# Patient Record
Sex: FEMALE | URBAN - METROPOLITAN AREA
[De-identification: names, ages, dates, MRNs, and addresses within clinical notes are randomized per-mention and may not be internally consistent; named-entity substitution may affect disease eponyms.]

---

## 2018-03-06 ENCOUNTER — TRANSFERRED RECORDS (OUTPATIENT)
Dept: HEALTH INFORMATION MANAGEMENT | Facility: CLINIC | Age: 38
End: 2018-03-06

## 2018-03-07 ENCOUNTER — MEDICAL CORRESPONDENCE (OUTPATIENT)
Dept: HEALTH INFORMATION MANAGEMENT | Facility: CLINIC | Age: 38
End: 2018-03-07

## 2018-04-06 DIAGNOSIS — H53.2 DIPLOPIA: Primary | ICD-10-CM

## 2018-04-09 ENCOUNTER — OFFICE VISIT (OUTPATIENT)
Dept: OPHTHALMOLOGY | Facility: CLINIC | Age: 38
End: 2018-04-09
Attending: OPHTHALMOLOGY
Payer: COMMERCIAL

## 2018-04-09 DIAGNOSIS — H50.05 ALTERNATING ESOTROPIA: Primary | ICD-10-CM

## 2018-04-09 DIAGNOSIS — H53.2 DIPLOPIA: ICD-10-CM

## 2018-04-09 PROCEDURE — 92060 SENSORIMOTOR EXAMINATION: CPT | Mod: ZF | Performed by: OPHTHALMOLOGY

## 2018-04-09 PROCEDURE — G0463 HOSPITAL OUTPT CLINIC VISIT: HCPCS | Mod: 25,ZF | Performed by: TECHNICIAN/TECHNOLOGIST

## 2018-04-09 RX ORDER — OMEGA-3 FATTY ACIDS/FISH OIL 300-1000MG
CAPSULE ORAL
COMMUNITY

## 2018-04-09 ASSESSMENT — CONF VISUAL FIELD
METHOD: COUNTING FINGERS
OS_NORMAL: 1
OD_NORMAL: 1

## 2018-04-09 ASSESSMENT — REFRACTION_WEARINGRX
OS_CYLINDER: +0.75
OS_SPHERE: -3.00
OS_AXIS: 188
OD_SPHERE: -2.50
SPECS_TYPE: SVL
OD_CYLINDER: SPHERE

## 2018-04-09 ASSESSMENT — TONOMETRY
IOP_METHOD: ICARE
OD_IOP_MMHG: 10
OS_IOP_MMHG: 12

## 2018-04-09 ASSESSMENT — EXTERNAL EXAM - RIGHT EYE: OD_EXAM: NORMAL

## 2018-04-09 ASSESSMENT — SLIT LAMP EXAM - LIDS
COMMENTS: NORMAL
COMMENTS: NORMAL

## 2018-04-09 ASSESSMENT — VISUAL ACUITY
OD_CC: 20/20
CORRECTION_TYPE: GLASSES
METHOD: SNELLEN - LINEAR
OS_CC: 20/20

## 2018-04-09 ASSESSMENT — EXTERNAL EXAM - LEFT EYE: OS_EXAM: NORMAL

## 2018-04-09 NOTE — PROGRESS NOTES
Assessment & Plan     Darshana Cash is a 37 year old female with the following diagnoses:   1. Alternating esotropia    2. Diplopia       She developed double vision as a teenager.  Her LEFT eye was a little crossed inward.  She had strabismus surgery.  Since then has had double vision and her crossing is getting worse.  She had an MRI brain about 10 years ago for headache and was told that the MRI was normal.    On exam her visual acuity is 20/20 in each eye.  Pupils are normal.  She has full extraocular motility and a relatively competent 20-25 prism diopter esotropia.  The rest of her examination is unremarkable with the exception of a left head tilt.    It is my impression that she has a long-standing esotropia status post strabismus surgery.  Since all this began she has had an MRI of her brain to workup her headaches and was told that this was normal.  Given the concomitant nature of this abnormality as well as the long-standing nature of it, I would imagine that this is related to her previous strabismus surgery and a breakdown of alignment.  We discussed a repeat insert strabismus surgery versus patching.  She wants to consider strabismus surgery.  Lastly I am not sure why she has the left head tilt.  She states that it is very long-standing.  She does not appear to have torsion of either eye.  I suggested that she might consider physical therapy for her neck since she believes that this may be partly the cause of her headache.  She is going to consider it.           Attending Physician Attestation:  Complete documentation of historical and exam elements from today's encounter can be found in the full encounter summary report (not reduplicated in this progress note).  I personally obtained the chief complaint(s) and history of present illness.  I confirmed and edited as necessary the review of systems, past medical/surgical history, family history, social history, and examination findings as  documented by others; and I examined the patient myself.  I personally reviewed the relevant tests, images, and reports as documented above.  I formulated and edited as necessary the assessment and plan and discussed the findings and management plan with the patient and family. - Steve Calabrese MD

## 2018-04-09 NOTE — LETTER
2018         RE:  :  MRN: Darshana Cash  1980  [id number]     Dear Dr. Lucero,    Thank you for asking me to see your very pleasant patient, Darshana Cash, in neuro-ophthalmic consultation.  I would like to thank you for sending your records and I have summarized them in the history of present illness.  My assessment and plan are below.  For further details, please see my attached clinic note.          Assessment & Plan     Darshana Cash is a 37 year old female with the following diagnoses:   1. Alternating esotropia    2. Diplopia       She developed double vision as a teenager.  Her LEFT eye was a little crossed inward.  She had strabismus surgery.  Since then has had double vision and her crossing is getting worse.  She had an MRI brain about 10 years ago for headache and was told that the MRI was normal.    On exam her visual acuity is 20/20 in each eye.  Pupils are normal.  She has full extraocular motility and a relatively competent 20-25 prism diopter esotropia.  The rest of her examination is unremarkable with the exception of a left head tilt.    It is my impression that she has a long-standing esotropia status post strabismus surgery.  Since all this began she has had an MRI of her brain to workup her headaches and was told that this was normal.  Given the concomitant nature of this abnormality as well as the long-standing nature of it, I would imagine that this is related to her previous strabismus surgery and a breakdown of alignment.  We discussed a repeat insert strabismus surgery versus patching.  She wants to consider strabismus surgery.  Lastly I am not sure why she has the left head tilt.  She states that it is very long-standing.  She does not appear to have torsion of either eye.  I suggested that she might consider physical therapy for her neck since she believes that this may be partly the cause of her headache.  She is going to consider it.          Again, thank you for  allowing me to participate in the care of your patient.      Sincerely,    Steve Calabrese MD  Professor, Neuro-Ophthalmology  Department of Ophthalmology and Visual Neurosciences  [facility]    CC: DOMINIC HARDY  [facility]  [address] [place] [address]  VIA Facsimile:  [phone]     Polina Cobb DO  [facility]  [address]  [place] [address]  VIA Facsimile: [phone]

## 2018-04-09 NOTE — MR AVS SNAPSHOT
After Visit Summary   4/9/2018    Darshana Cash    MRN: [id number]           Patient Information     Date Of Birth          1980        Visit Information        Provider Department      4/9/2018 1:30 PM Steve Calabrese MD [facility]        Today's Diagnoses     Diplopia           Follow-ups after your visit        Your next 10 appointments already scheduled     May 09, 2018  2:00 PM CDT   New Adult Strabismus with Amado Ocampo MD   Eye Clinic ([facility])    [address]  [address] [address] [place] [address]   [phone]              Who to contact     Please call your clinic at [phone] to:    Ask questions about your health    Make or cancel appointments    Discuss your medicines    Learn about your test results    Speak to your doctor            Additional Information About Your Visit        MyChart Information     [employer] gives you secure access to your electronic health record. If you see a primary care provider, you can also send messages to your care team and make appointments. If you have questions, please call your primary care clinic.  If you do not have a primary care provider, please call [phone] and they will assist you.      [employer] is an electronic gateway that provides easy, online access to your medical records. With [employer], you can request a clinic appointment, read your test results, renew a prescription or communicate with your care team.     To access your existing account, please contact your [facility] or call [phone] for assistance.        Care EveryWhere ID     This is your Care EveryWhere ID. This could be used by other organizations to access your [facility] medical records  [id number]         Blood Pressure from Last 3 Encounters:   No data found for BP    Weight from Last 3 Encounters:   No data found for Wt              We Performed the Following      IOP Measurement     Sensorimotor        Primary Care Provider Office Phone # Fax #    Polina Cobb [phone]       [facility] [address]  [facility] [address]        Equal Access to Services     SHONNA BRANHAM : Hadii brittany copeland hadanto Soomaali, waaxda luqadaha, qaybta kaalmada adeegyada, elsy stackn donedwin jackson eden chavis. So [facility] [phone].    ATENCIÓN: Si habla español, tiene a cha disposición servicios gratuitos de asistencia lingüística. Llame al [phone].    We comply with applicable federal civil rights laws and [place] laws. We do not discriminate on the basis of race, color, national origin, age, disability, sex, sexual orientation, or gender identity.            Thank you!     Thank you for choosing EYE CLINIC  for your care. Our goal is always to provide you with excellent care. Hearing back from our patients is one way we can continue to improve our services. Please take a few minutes to complete the written survey that you may receive in the mail after your visit with us. Thank you!             Your Updated Medication List - Protect others around you: Learn how to safely use, store and throw away your medicines at www.disposemymeds.org.          This list is accurate as of 4/9/18  2:30 PM.  Always use your most recent med list.                   Brand Name Dispense Instructions for use Diagnosis    ADVIL 200 MG capsule   Generic drug:  ibuprofen

## 2018-04-09 NOTE — NURSING NOTE
Chief Complaints and History of Present Illnesses   Patient presents with     New Patient     referred for double vision and strabismus     HPI    Symptoms:              Comments:  Darshana Cash is a 37 year old F, referred for strabismus and diplopia.     Patient states horizontal diplopia since 1990s.  Esotropia since she was young.     S/p eye muscle surgery x1 for esotropia, done in [place], [place].  H/o patching when younger.   H/o fresnel prism when she was a [employer].   Closes LEFT eye.   Headaches daily with head tilt per patient. Chiropractor states possible that headaches are related to eyes.     LINDEN Ruiz 4/9/2018 1:41 PM

## 2018-04-15 ENCOUNTER — HEALTH MAINTENANCE LETTER (OUTPATIENT)
Age: 38
End: 2018-04-15

## 2018-05-09 ENCOUNTER — OFFICE VISIT (OUTPATIENT)
Dept: OPHTHALMOLOGY | Facility: CLINIC | Age: 38
End: 2018-05-09
Attending: OPHTHALMOLOGY
Payer: COMMERCIAL

## 2018-05-09 DIAGNOSIS — H53.2 DIPLOPIA: ICD-10-CM

## 2018-05-09 DIAGNOSIS — H53.2 DOUBLE VISION: Primary | ICD-10-CM

## 2018-05-09 DIAGNOSIS — H50.00 ESOTROPIA, MONOCULAR: Primary | ICD-10-CM

## 2018-05-09 DIAGNOSIS — H53.2 DOUBLE VISION: ICD-10-CM

## 2018-05-09 DIAGNOSIS — H50.21 HYPERTROPIA OF RIGHT EYE: ICD-10-CM

## 2018-05-09 PROCEDURE — 92060 SENSORIMOTOR EXAMINATION: CPT | Mod: ZF | Performed by: OPHTHALMOLOGY

## 2018-05-09 PROCEDURE — G0463 HOSPITAL OUTPT CLINIC VISIT: HCPCS | Mod: 25,ZF

## 2018-05-09 RX ORDER — IBUPROFEN 200 MG
200-600 TABLET ORAL
COMMUNITY
End: 2018-11-26

## 2018-05-09 ASSESSMENT — TONOMETRY
OS_IOP_MMHG: 16
IOP_METHOD: ICARE
OD_IOP_MMHG: 14

## 2018-05-09 ASSESSMENT — VISUAL ACUITY
METHOD: SNELLEN - LINEAR
OS_CC: 20/20
OD_CC: 20/20

## 2018-05-09 ASSESSMENT — EXTERNAL EXAM - LEFT EYE: OS_EXAM: NORMAL

## 2018-05-09 ASSESSMENT — SLIT LAMP EXAM - LIDS
COMMENTS: NORMAL
COMMENTS: NORMAL

## 2018-05-09 ASSESSMENT — REFRACTION_WEARINGRX
SPECS_TYPE: SVL
OD_SPHERE: -2.50
OS_SPHERE: -3.25
OS_AXIS: 005
OS_CYLINDER: +0.75
OD_CYLINDER: SPHERE

## 2018-05-09 ASSESSMENT — EXTERNAL EXAM - RIGHT EYE: OD_EXAM: NORMAL

## 2018-05-09 NOTE — MR AVS SNAPSHOT
After Visit Summary   5/9/2018    Darshana Cash    MRN: [id number]           Patient Information     Date Of Birth          1980        Visit Information        Provider Department      5/9/2018 2:00 PM Amado Ocampo MD Eye Clinic        Today's Diagnoses     Diplopia        Double vision           Follow-ups after your visit        Your next 10 appointments already scheduled     May 29, 2018 12:30 PM CDT   ORTHOPTICS with [facility] EYE ORTHOPTICS   [facility] Peds Eye General ([facility])    [address]  [address] [address] [phone]              Who to contact     Please call your clinic at [phone] to:    Ask questions about your health    Make or cancel appointments    Discuss your medicines    Learn about your test results    Speak to your doctor            Additional Information About Your Visit        MyChart Information     [employer] gives you secure access to your electronic health record. If you see a primary care provider, you can also send messages to your care team and make appointments. If you have questions, please call your primary care clinic.  If you do not have a primary care provider, please call [phone] and they will assist you.      [employer] is an electronic gateway that provides easy, online access to your medical records. With [employer], you can request a clinic appointment, read your test results, renew a prescription or communicate with your care team.     To access your existing account, please contact your [facility] or call [phone] for assistance.        Care EveryWhere ID     This is your Care EveryWhere ID. This could be used by other organizations to access your [place] medical records  [id number]         Blood Pressure from Last 3 Encounters:   No data found for BP    Weight from Last 3 Encounters:   No data found for Wt              We Performed the Following     Adult  Strabismus Referral     Color Vision - Screening OU (both eyes)     IOP Measurement     Sensorimotor        Primary Care Provider Office Phone # Fax #    Polina Cobb [phone]       [facility] [address]  [facility] [address]        Equal Access to Services     SHONNA BARNHAM : Hadii brittany ku hadasho Soomaali, waaxda luqadaha, qaybta kaalmada adeegyada, waxcarlos kelley sreekanthlyla rand corinnebernadine chavis. So [facility] [phone].    ATENCIÓN: Si habla español, tiene a cha disposición servicios gratuitos de asistencia lingüística. Llame al [phone].    We comply with applicable federal civil rights laws and [place] laws. We do not discriminate on the basis of race, color, national origin, age, disability, sex, sexual orientation, or gender identity.            Thank you!     Thank you for choosing EYE CLINIC  for your care. Our goal is always to provide you with excellent care. Hearing back from our patients is one way we can continue to improve our services. Please take a few minutes to complete the written survey that you may receive in the mail after your visit with us. Thank you!             Your Updated Medication List - Protect others around you: Learn how to safely use, store and throw away your medicines at www.disposemymeds.org.          This list is accurate as of 5/9/18  4:09 PM.  Always use your most recent med list.                   Brand Name Dispense Instructions for use Diagnosis    * ADVIL 200 MG capsule   Generic drug:  ibuprofen           * ibuprofen 200 MG tablet    ADVIL/MOTRIN     200-600 mg        * Notice:  This list has 2 medication(s) that are the same as other medications prescribed for you. Read the directions carefully, and ask your doctor or other care provider to review them with you.

## 2018-05-09 NOTE — PROGRESS NOTES
1. Residual esotropia- unable to prove fusion today and patient unable to convincingly suppress in the left eye under binocular conditions  2. 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     Mrs. Cash is a 37 year old lady assessed in neuro-ophthalmology clinic today for a residual left esotropia.    She had previous strabismus surgery on February 24, 2009 by a loc[facility] ten[facility] physician in [place] with a pre-operative left esotropia of 12 prism diopters in the distance and 18-20 diopters at near.  It was an isolated left medial rectus recession.  It was successful in that it left her with a left esotropia of only 4 prism diopters, but over time this has increased.    She has noticed gradually that people are trying to find which eye to look at.  She does not have a history of amblyopia, the use of prism glasses, or of vision therapy.  She notices diplopia all of the time and seems unable to suppress it.    On examination her visual acuity is 20/20 in both eyes with no relative afferent pupillary defect, normal intraocular pressures, full color plates, and full extraocular movements.  She has a left esotropia ranging from 14-20 prism diopters in primary position with no vertical deviation.  She does have a vertical deviation on right tilt (LHT 2) and right turn (RHT 2).  At near she has a left esotropia of 20 prism diopters.  She has diplopia on the [place] 4 Dot test (2 red, 3 green) in near and distance.  She subjective suppresses more easily with 14-16 base out prism.  She was unable to fuse with any prism with me.    Her anterior and posterior segment examination was unremarkable.    In summary, Mrs. Cash has a residual esotropia post-left medial rectus recession with no apparent fusional capacity.  I have suggested she return for synoptophore testing at the pediatric eye clinic to see whether she is capable of fusion or to plot out her optimal alignment to  maximize suppression in the left eye under binocular conditions.  I will arrange for this.         Complete documentation of historical and exam elements from today's encounter can be found in the full encounter summary report (not reduplicated in this progress note).  I personally obtained the chief complaint(s) and history of present illness.  I confirmed and edited as necessary the review of systems, past medical/surgical history, family history, social history, and examination findings as documented by others; and I examined the patient myself.  I personally reviewed the relevant tests, images, and reports as documented above.  I formulated and edited as necessary the assessment and plan and discussed the findings and management plan with the patient and family   Amado Ocampo MD

## 2018-05-29 ENCOUNTER — OFFICE VISIT (OUTPATIENT)
Dept: OPHTHALMOLOGY | Facility: CLINIC | Age: 38
End: 2018-05-29
Attending: OPHTHALMOLOGY
Payer: COMMERCIAL

## 2018-05-29 DIAGNOSIS — H50.42 MONOFIXATION SYNDROME: Primary | ICD-10-CM

## 2018-05-29 PROCEDURE — G0463 HOSPITAL OUTPT CLINIC VISIT: HCPCS | Mod: ZF

## 2018-05-29 PROCEDURE — 92060 SENSORIMOTOR EXAMINATION: CPT | Mod: ZF

## 2018-05-29 ASSESSMENT — REFRACTION_WEARINGRX
OS_CYLINDER: +0.75
OD_SPHERE: -2.50
OD_CYLINDER: SPHERE
SPECS_TYPE: SVL
OS_SPHERE: -3.25
OS_AXIS: 005

## 2018-05-29 ASSESSMENT — VISUAL ACUITY
OD_CC: 20/20
OS_CC: 20/20
METHOD: SNELLEN - LINEAR

## 2018-05-29 NOTE — NURSING NOTE
Chief Complaint   Patient presents with     Diplopia Evaluation     here for synopto[hore, difficult fusion in her exam, suppression vs intractable diplopia. Darshana dscribes diplopia at distance, rarely at near. Has right tilt, wondering if ocular. Longstanding ET, she mentioned that she was going to have surgery when she was a child but it was cancelled due to sensory status.      HPI    Informant(s):  patient   Symptoms:           Do you have eye pain now?:  No

## 2018-05-29 NOTE — MR AVS SNAPSHOT
After Visit Summary   5/29/2018    Darshana Cash    MRN: [id number]           Patient Information     Date Of Birth          1980        Visit Information        Provider Department      5/29/2018 12:30 PM P EYE ORTHOPTICS [facility] Peds Eye General        Today's Diagnoses     Monofixation syndrome    -  1       Follow-ups after your visit        Follow-up notes from your care team     Return in about 3 months (around 8/29/2018).      Who to contact     Please call your clinic at [phone] to:    Ask questions about your health    Make or cancel appointments    Discuss your medicines    Learn about your test results    Speak to your doctor            Additional Information About Your Visit        [employer] - [employer] Information     [employer] gives you secure access to your electronic health record. If you see a primary care provider, you can also send messages to your care team and make appointments. If you have questions, please call your primary care clinic.  If you do not have a primary care provider, please call [phone] and they will assist you.      [employer] is an electronic gateway that provides easy, online access to your medical records. With [employer], you can request a clinic appointment, read your test results, renew a prescription or communicate with your care team.     To access your existing account, please contact your [facility] or call [phone] for assistance.        Care EveryWhere ID     This is your Care EveryWhere ID. This could be used by other organizations to access your [place] medical records  [id number]         Blood Pressure from Last 3 Encounters:   No data found for BP    Weight from Last 3 Encounters:   No data found for Wt              We Performed the Following     Sensorimotor        Primary Care Provider Office Phone # Fax #    Polina Cobb [phone]       [place] [address] [address] [address]         Equal Access to Services     [facility] : Hadii aad ku hadantbell Donyali, wayulida lunallelyfrancieha, qahanh sulmajoleenelsy villegas. So [facility] [phone].    ATENCIÓN: Si habla español, tiene a cha disposición servicios gratuitos de asistencia lingüística. Llame al [phone].    We comply with applicable federal civil rights laws and [place] laws. We do not discriminate on the basis of race, color, national origin, age, disability, sex, sexual orientation, or gender identity.            Thank you!     Thank you for choosing [facility] EYE GENERAL  for your care. Our goal is always to provide you with excellent care. Hearing back from our patients is one way we can continue to improve our services. Please take a few minutes to complete the written survey that you may receive in the mail after your visit with us. Thank you!             Your Updated Medication List - Protect others around you: Learn how to safely use, store and throw away your medicines at www.disposemymeds.org.          This list is accurate as of 5/29/18  2:51 PM.  Always use your most recent med list.                   Brand Name Dispense Instructions for use Diagnosis    * ADVIL 200 MG capsule   Generic drug:  ibuprofen           * ibuprofen 200 MG tablet    ADVIL/MOTRIN     200-600 mg        * Notice:  This list has 2 medication(s) that are the same as other medications prescribed for you. Read the directions carefully, and ask your doctor or other care provider to review them with you.

## 2018-05-29 NOTE — PROGRESS NOTES
Chief Complaint(s) & History of Present Illness  Chief Complaint   Patient presents with     Diplopia Evaluation     here for synopto[hore, difficult fusion in her exam, suppression vs intractable diplopia. Darshana barnardbes diplopia at distance, rarely at near. Has right tilt, wondering if ocular. Longstanding ET, she mentioned that she was going to have surgery when she was a child but it was cancelled due to sensory status.           Assessment and Plan:      Darshana Csah is a 37 year old female who presents with:     Monofixation syndrome  Fuses for a brief period with 20 DAVID. Intractable diplopia vs alternation?   Able to fuse in the W4 dot with 12 DAVID (without glasses)   Fusion on and off in the synoptophore. I did not find a suppression scotoma     We will try prism adaptation with 20 DAVDI fresnel in the LE.      - Sensorimotor       PLAN:  RTN with Dr Ocampo in 3 months

## 2018-08-06 ENCOUNTER — TELEPHONE (OUTPATIENT)
Dept: OPHTHALMOLOGY | Facility: CLINIC | Age: 38
End: 2018-08-06

## 2018-08-06 NOTE — TELEPHONE ENCOUNTER
"A message was left for patient/family to confirm upcoming appointment scheduled for 08/07/2018 .    Family was provided with the clinic address and phone number? Yes    Patient/family was advised that appointments can last from 2-4 hours and read the appropriate call scripts for the visit? Yes    Scripts used for this call: Adult: \"Please be aware that your appointment can last anywhere from 2-4 hours, especially if additional testing is needed.\"       Julissa Almaraz  "

## 2018-08-07 ENCOUNTER — OFFICE VISIT (OUTPATIENT)
Dept: OPHTHALMOLOGY | Facility: CLINIC | Age: 38
End: 2018-08-07
Attending: OPHTHALMOLOGY
Payer: COMMERCIAL

## 2018-08-07 DIAGNOSIS — H53.2 DIPLOPIA: ICD-10-CM

## 2018-08-07 DIAGNOSIS — H50.012 ESOTROPIA, LEFT EYE: Primary | ICD-10-CM

## 2018-08-07 PROCEDURE — V2718 FRESNELL PRISM PRESS-ON LENS: HCPCS | Mod: ZF | Performed by: OPHTHALMOLOGY

## 2018-08-07 PROCEDURE — 92060 SENSORIMOTOR EXAMINATION: CPT | Mod: ZF | Performed by: OPHTHALMOLOGY

## 2018-08-07 PROCEDURE — G0463 HOSPITAL OUTPT CLINIC VISIT: HCPCS | Mod: 25,ZF

## 2018-08-07 ASSESSMENT — REFRACTION_WEARINGRX
OD_CYLINDER: SPHERE
OD_SPHERE: -2.50
OS_SPHERE: -3.25
OS_HPRISM: 20
OS_AXIS: 005
OS_HBASE: OUT
OS_CYLINDER: +0.75
SPECS_TYPE: SVL

## 2018-08-07 ASSESSMENT — VISUAL ACUITY
OD_CC: 20/20
METHOD: SNELLEN - LINEAR
OS_CC: 20/20

## 2018-08-07 NOTE — MR AVS SNAPSHOT
After Visit Summary   8/7/2018    Darshana Cash    MRN: [id number]           Patient Information     Date Of Birth          1980        Visit Information        Provider Department      8/7/2018 1:45 PM Amado Ocampo MD [facility] Eye General        Today's Diagnoses     Esotropia, left eye    -  1    Diplopia           Follow-ups after your visit        Your next 10 appointments already scheduled     Sep 18, 2018  1:00 PM CDT   Return Adult Strabismus with Amado Ocampo MD   [facility] ([facility])    [address] [facility] [address]  [address] [id number] [phone]              Who to contact     Please call your clinic at [phone] to:    Ask questions about your health    Make or cancel appointments    Discuss your medicines    Learn about your test results    Speak to your doctor            Additional Information About Your Visit        MyChart Information     Ayi Lailet gives you secure access to your electronic health record. If you see a primary care provider, you can also send messages to your care team and make appointments. If you have questions, please call your primary care clinic.  If you do not have a primary care provider, please call [phone] and they will assist you.      SurgeonK[employer]z is an electronic gateway that provides easy, online access to your medical records. With [employer], you can request a clinic appointment, read your test results, renew a prescription or communicate with your care team.     To access your existing account, please contact your [facility] or call [phone] for assistance.        Care EveryWhere ID     This is your Care EveryWhere ID. This could be used by other organizations to access your [place] medical records  [id number]         Blood Pressure from Last 3 Encounters:   No data found for BP    Weight from Last 3 Encounters:   No data found for Wt               We Performed the Following     FRESNELL PRISM PRESS-ON LENS     Sensorimotor        Primary Care Provider Office Phone # Fax #    Polina Cobb [phone]       [place] [address]  [facility] [address]        Equal Access to Services     VIKTORIYAROSS CARROL : Hadii aad ku hadanto Soomaali, waaxda luqadaha, qaybta kaalmada adeegyada, waxcarlos idiin haymckaylan adeedwin jackson eden . So [facility] [phone].    ATENCIÓN: Si habla español, tiene a cha disposición servicios gratuitos de asistencia lingüística. Llame al [phone].    We comply with applicable federal civil rights laws and [place] laws. We do not discriminate on the basis of race, color, national origin, age, disability, sex, sexual orientation, or gender identity.            Thank you!     Thank you for choosing [facility]  for your care. Our goal is always to provide you with excellent care. Hearing back from our patients is one way we can continue to improve our services. Please take a few minutes to complete the written survey that you may receive in the mail after your visit with us. Thank you!             Your Updated Medication List - Protect others around you: Learn how to safely use, store and throw away your medicines at www.disposemymeds.org.          This list is accurate as of 8/7/18  2:13 PM.  Always use your most recent med list.                   Brand Name Dispense Instructions for use Diagnosis    * ADVIL 200 MG capsule   Generic drug:  ibuprofen           * ibuprofen 200 MG tablet    ADVIL/MOTRIN     200-600 mg        * Notice:  This list has 2 medication(s) that are the same as other medications prescribed for you. Read the directions carefully, and ask your doctor or other care provider to review them with you.

## 2018-08-07 NOTE — PROGRESS NOTES
1. Esotropia- patient represents a difficult case where she does not fuse and has lost facultative suppression. Synaptophore did not demonstrate fusion nor was there a strabismus angle that led to enhanced suppression.  She has been wearing 20 base out Fresnel and today built up an additional 15 prism diopters of esotropia.  We have given her now a 35 base out Fresnel and she feels subjectively that her diplopia persists with this prism correction but is not nearly as bothersome as it is without prism.  Return to clinic in 1-2 months and repeat measurements.  If strabismus stable then operate for prism correction which subjectively improves her diplopia.            Complete documentation of historical and exam elements from today's encounter can be found in the full encounter summary report (not reduplicated in this progress note).  I personally obtained the chief complaint(s) and history of present illness.  I confirmed and edited as necessary the review of systems, past medical/surgical history, family history, social history, and examination findings as documented by others; and I examined the patient myself.  I personally reviewed the relevant tests, images, and reports as documented above.  I formulated and edited as necessary the assessment and plan and discussed the findings and management plan with the patient and family     Amado Ocampo MD

## 2018-08-12 ASSESSMENT — SLIT LAMP EXAM - LIDS
COMMENTS: NORMAL
COMMENTS: NORMAL

## 2018-08-12 ASSESSMENT — EXTERNAL EXAM - RIGHT EYE: OD_EXAM: NORMAL

## 2018-08-12 ASSESSMENT — EXTERNAL EXAM - LEFT EYE: OS_EXAM: NORMAL

## 2018-10-02 ENCOUNTER — OFFICE VISIT (OUTPATIENT)
Dept: OPHTHALMOLOGY | Facility: CLINIC | Age: 38
End: 2018-10-02
Attending: OPHTHALMOLOGY
Payer: COMMERCIAL

## 2018-10-02 DIAGNOSIS — H53.10 SUBJECTIVE VISUAL DISTURBANCE: Primary | ICD-10-CM

## 2018-10-02 DIAGNOSIS — H53.10 SUBJECTIVE VISUAL DISTURBANCE: ICD-10-CM

## 2018-10-02 DIAGNOSIS — H50.012 MONOCULAR ESOTROPIA, LEFT EYE: Primary | ICD-10-CM

## 2018-10-02 PROCEDURE — 92060 SENSORIMOTOR EXAMINATION: CPT | Mod: ZF | Performed by: OPHTHALMOLOGY

## 2018-10-02 PROCEDURE — G0463 HOSPITAL OUTPT CLINIC VISIT: HCPCS | Mod: 25,ZF

## 2018-10-02 ASSESSMENT — TONOMETRY
IOP_METHOD: ICARE SINGLE
OS_IOP_MMHG: 15
OD_IOP_MMHG: 14

## 2018-10-02 ASSESSMENT — REFRACTION_WEARINGRX
OS_SPHERE: -3.25
SPECS_TYPE: SVL
OS_CYLINDER: +0.75
OD_SPHERE: -2.50
OS_AXIS: 005
OD_CYLINDER: SPHERE
OS_HPRISM: 35
OS_HBASE: OUT

## 2018-10-02 ASSESSMENT — CUP TO DISC RATIO
OS_RATIO: 0.3
OD_RATIO: 0.3

## 2018-10-02 ASSESSMENT — EXTERNAL EXAM - RIGHT EYE: OD_EXAM: NORMAL

## 2018-10-02 ASSESSMENT — VISUAL ACUITY
METHOD: SNELLEN - LINEAR
OS_CC: 20/20
CORRECTION_TYPE: GLASSES
OD_CC: 20/20

## 2018-10-02 ASSESSMENT — SLIT LAMP EXAM - LIDS
COMMENTS: NORMAL
COMMENTS: NORMAL

## 2018-10-02 ASSESSMENT — EXTERNAL EXAM - LEFT EYE: OS_EXAM: NORMAL

## 2018-10-02 NOTE — MR AVS SNAPSHOT
After Visit Summary   10/2/2018    Darshana Cash    MRN: [id number]           Patient Information     Date Of Birth          1980        Visit Information        Provider Department      10/2/2018 9:30 AM Amado Ocampo MD [facility] Peds Eye General        Today's Diagnoses     Monocular esotropia, left eye    -  1    Subjective visual disturbance           Follow-ups after your visit        Your next 10 appointments already scheduled     Dec 04, 2018  9:30 AM CST   Post-Op with Amado Ocampo MD   [facility] Eye General ([facility])    [address] [place] [address]  [address] [id number] [phone]              Who to contact     Please call your clinic at [phone] to:    Ask questions about your health    Make or cancel appointments    Discuss your medicines    Learn about your test results    Speak to your doctor            Additional Information About Your Visit        MyChart Information     [employer] gives you secure access to your electronic health record. If you see a primary care provider, you can also send messages to your care team and make appointments. If you have questions, please call your primary care clinic.  If you do not have a primary care provider, please call [phone] and they will assist you.      [employer] is an electronic gateway that provides easy, online access to your medical records. With [employer], you can request a clinic appointment, read your test results, renew a prescription or communicate with your care team.     To access your existing account, please contact your [facility] or call [phone] for assistance.        Care EveryWhere ID     This is your Care EveryWhere ID. This could be used by other organizations to access your [place] medical records  [id number]         Blood Pressure from Last 3 Encounters:   No data found for BP    Weight from Last 3 Encounters:   No  data found for Wt              We Performed the Following     IOP Measurement     Marisol-Operative Worksheet (Peds)     Sensorimotor        Primary Care Provider Office Phone # Fax #    Polina Cobb [phone]       [place] [address]  [facility] [address]        Equal Access to Services     SHONNA BRANHAM : Hadii aad ku hadasho Soomaali, waaxda luqadaha, qaybta kaalmada adeegyada, waxay merin haymckaylan keyona corinnebernadine harmon . So [facility] [phone].    ATENCIÓN: Si habla español, tiene a cha disposición servicios gratuitos de asistencia lingüística. Llame al [phone].    We comply with applicable federal civil rights laws and [place] laws. We do not discriminate on the basis of race, color, national origin, age, disability, sex, sexual orientation, or gender identity.            Thank you!     Thank you for choosing [facility]  for your care. Our goal is always to provide you with excellent care. Hearing back from our patients is one way we can continue to improve our services. Please take a few minutes to complete the written survey that you may receive in the mail after your visit with us. Thank you!             Your Updated Medication List - Protect others around you: Learn how to safely use, store and throw away your medicines at www.disposemymeds.org.          This list is accurate as of 10/2/18 11:00 AM.  Always use your most recent med list.                   Brand Name Dispense Instructions for use Diagnosis    * ADVIL 200 MG capsule   Generic drug:  ibuprofen           * ibuprofen 200 MG tablet    ADVIL/MOTRIN     200-600 mg        * Notice:  This list has 2 medication(s) that are the same as other medications prescribed for you. Read the directions carefully, and ask your doctor or other care provider to review them with you.

## 2018-10-02 NOTE — NURSING NOTE
Chief Complaint   Patient presents with     Esotropia Follow Up     wearing 35 DAVID LE, likes the prism, images are closer together, sometimes sees single, other times sees one and a half. Happy with that amount of prism, head posture improved, has less headaches.      HPI    Informant(s):  patient   Symptoms:           Do you have eye pain now?:  No

## 2018-10-02 NOTE — PROGRESS NOTES
"   1. Esotropia - patient represents a difficult case where she does not fuse and has lost facultative suppression. Synaptophore did not demonstrate fusion nor was there a strabismus angle that led to enhanced suppression.  She is now wearing a 35 base out Fresnel and she feels subjectively that her diplopia persists with this prism correction but is not nearly as bothersome as it is without prism.  Return to clinic in 1-2 months and repeat measurements.  If strabismus stable then operate for prism correction which subjectively improves her diplopia.     S/IE:    - last seen 2 months ago.    - doing well. She much rather prefers wearing her prisms compared to with out (wearing her prisms for past month or so).   - she sees \"almost one\", although at times, even when she closes the left eye, she isn't sure if she is seeing one.    - she is happy with her current prisms.    - her ANOMALOUS HEAD POSTURE much improved as well.     We discussed in detail the risks, benefits, and alternatives of eye muscle correction surgery including the very rare risk of death or serious morbidity from a general anesthesia complication and the rare risk of severe vision loss in the operative eye(s) secondary to retinal detachment or endophthalmitis.  We discussed more likely sub-optimal outcomes including the unanticipated need for additional strabismus surgery.  Finally the patient was aware that prisms glasses may be required to optimize single binocular vision following surgery.    After a thorough discussion of these risks, the patient decided to proceed with strabismus surgery.  The surgical plan is as follows:     1. Bilateral eye muscle correction (fixed suture)    Target 40 prism diopter esotropia correction.    Follow-up 1 week after strabismus surgery.    Plan to operate at: [facility]  Prism free glasses for adjustment: patient has prism free glasses- non-adjustable procedure.         Complete documentation of historical and exam " elements from today's encounter can be found in the full encounter summary report (not reduplicated in this progress note).  I personally obtained the chief complaint(s) and history of present illness.  I confirmed and edited as necessary the review of systems, past medical/surgical history, family history, social history, and examination findings as documented by others; and I examined the patient myself.  I personally reviewed the relevant tests, images, and reports as documented above.  I formulated and edited as necessary the assessment and plan and discussed the findings and management plan with the patient and family     Amado Ocampo MD

## 2018-11-23 ENCOUNTER — ANESTHESIA EVENT (OUTPATIENT)
Dept: SURGERY | Facility: AMBULATORY SURGERY CENTER | Age: 38
End: 2018-11-23

## 2018-11-26 ENCOUNTER — SURGERY (OUTPATIENT)
Age: 38
End: 2018-11-26

## 2018-11-26 ENCOUNTER — HOSPITAL ENCOUNTER (OUTPATIENT)
Facility: AMBULATORY SURGERY CENTER | Age: 38
End: 2018-11-26
Attending: OPHTHALMOLOGY
Payer: COMMERCIAL

## 2018-11-26 ENCOUNTER — ANESTHESIA (OUTPATIENT)
Dept: SURGERY | Facility: AMBULATORY SURGERY CENTER | Age: 38
End: 2018-11-26

## 2018-11-26 VITALS
RESPIRATION RATE: 12 BRPM | SYSTOLIC BLOOD PRESSURE: 123 MMHG | OXYGEN SATURATION: 98 % | BODY MASS INDEX: 24.33 KG/M2 | DIASTOLIC BLOOD PRESSURE: 77 MMHG | TEMPERATURE: 98.8 F | WEIGHT: 155 LBS | HEIGHT: 67 IN

## 2018-11-26 DIAGNOSIS — Z98.890 POST-OPERATIVE STATE: Primary | ICD-10-CM

## 2018-11-26 LAB
HCG UR QL: NEGATIVE
INTERNAL QC OK POCT: YES

## 2018-11-26 RX ORDER — GLYCOPYRROLATE 0.2 MG/ML
INJECTION, SOLUTION INTRAMUSCULAR; INTRAVENOUS PRN
Status: DISCONTINUED | OUTPATIENT
Start: 2018-11-26 | End: 2018-11-26

## 2018-11-26 RX ORDER — BALANCED SALT SOLUTION 6.4; .75; .48; .3; 3.9; 1.7 MG/ML; MG/ML; MG/ML; MG/ML; MG/ML; MG/ML
SOLUTION OPHTHALMIC PRN
Status: DISCONTINUED | OUTPATIENT
Start: 2018-11-26 | End: 2018-11-26 | Stop reason: HOSPADM

## 2018-11-26 RX ORDER — ONDANSETRON 4 MG/1
4 TABLET, ORALLY DISINTEGRATING ORAL EVERY 30 MIN PRN
Status: DISCONTINUED | OUTPATIENT
Start: 2018-11-26 | End: 2018-11-27 | Stop reason: HOSPADM

## 2018-11-26 RX ORDER — NALOXONE HYDROCHLORIDE 0.4 MG/ML
.1-.4 INJECTION, SOLUTION INTRAMUSCULAR; INTRAVENOUS; SUBCUTANEOUS
Status: DISCONTINUED | OUTPATIENT
Start: 2018-11-26 | End: 2018-11-27 | Stop reason: HOSPADM

## 2018-11-26 RX ORDER — IBUPROFEN 200 MG
400 TABLET ORAL ONCE
Status: COMPLETED | OUTPATIENT
Start: 2018-11-26 | End: 2018-11-26

## 2018-11-26 RX ORDER — FENTANYL CITRATE 50 UG/ML
INJECTION, SOLUTION INTRAMUSCULAR; INTRAVENOUS PRN
Status: DISCONTINUED | OUTPATIENT
Start: 2018-11-26 | End: 2018-11-26

## 2018-11-26 RX ORDER — LIDOCAINE 40 MG/G
CREAM TOPICAL
Status: DISCONTINUED | OUTPATIENT
Start: 2018-11-26 | End: 2018-11-27 | Stop reason: HOSPADM

## 2018-11-26 RX ORDER — PROPOFOL 10 MG/ML
INJECTION, EMULSION INTRAVENOUS CONTINUOUS PRN
Status: DISCONTINUED | OUTPATIENT
Start: 2018-11-26 | End: 2018-11-26

## 2018-11-26 RX ORDER — FENTANYL CITRATE 50 UG/ML
25-50 INJECTION, SOLUTION INTRAMUSCULAR; INTRAVENOUS EVERY 5 MIN PRN
Status: DISCONTINUED | OUTPATIENT
Start: 2018-11-26 | End: 2018-11-27 | Stop reason: HOSPADM

## 2018-11-26 RX ORDER — MEPERIDINE HYDROCHLORIDE 25 MG/ML
12.5 INJECTION INTRAMUSCULAR; INTRAVENOUS; SUBCUTANEOUS
Status: DISCONTINUED | OUTPATIENT
Start: 2018-11-26 | End: 2018-11-27 | Stop reason: HOSPADM

## 2018-11-26 RX ORDER — LIDOCAINE HYDROCHLORIDE 20 MG/ML
INJECTION, SOLUTION INFILTRATION; PERINEURAL PRN
Status: DISCONTINUED | OUTPATIENT
Start: 2018-11-26 | End: 2018-11-26

## 2018-11-26 RX ORDER — SODIUM CHLORIDE, SODIUM LACTATE, POTASSIUM CHLORIDE, CALCIUM CHLORIDE 600; 310; 30; 20 MG/100ML; MG/100ML; MG/100ML; MG/100ML
INJECTION, SOLUTION INTRAVENOUS CONTINUOUS
Status: DISCONTINUED | OUTPATIENT
Start: 2018-11-26 | End: 2018-11-27 | Stop reason: HOSPADM

## 2018-11-26 RX ORDER — ONDANSETRON 2 MG/ML
INJECTION INTRAMUSCULAR; INTRAVENOUS PRN
Status: DISCONTINUED | OUTPATIENT
Start: 2018-11-26 | End: 2018-11-26

## 2018-11-26 RX ORDER — GABAPENTIN 300 MG/1
300 CAPSULE ORAL ONCE
Status: COMPLETED | OUTPATIENT
Start: 2018-11-26 | End: 2018-11-26

## 2018-11-26 RX ORDER — OXYCODONE HYDROCHLORIDE 5 MG/1
5 TABLET ORAL EVERY 4 HOURS PRN
Status: DISCONTINUED | OUTPATIENT
Start: 2018-11-26 | End: 2018-11-27 | Stop reason: HOSPADM

## 2018-11-26 RX ORDER — OXYMETAZOLINE HYDROCHLORIDE 0.05 G/100ML
SPRAY NASAL PRN
Status: DISCONTINUED | OUTPATIENT
Start: 2018-11-26 | End: 2018-11-26 | Stop reason: HOSPADM

## 2018-11-26 RX ORDER — ACETAMINOPHEN 325 MG/1
975 TABLET ORAL ONCE
Status: COMPLETED | OUTPATIENT
Start: 2018-11-26 | End: 2018-11-26

## 2018-11-26 RX ORDER — ONDANSETRON 2 MG/ML
4 INJECTION INTRAMUSCULAR; INTRAVENOUS EVERY 30 MIN PRN
Status: DISCONTINUED | OUTPATIENT
Start: 2018-11-26 | End: 2018-11-27 | Stop reason: HOSPADM

## 2018-11-26 RX ORDER — PREDNISOLONE ACETATE 10 MG/ML
1-2 SUSPENSION/ DROPS OPHTHALMIC 4 TIMES DAILY
Qty: 1 BOTTLE | Refills: 0 | Status: SHIPPED | OUTPATIENT
Start: 2018-11-26

## 2018-11-26 RX ORDER — DEXAMETHASONE SODIUM PHOSPHATE 4 MG/ML
INJECTION, SOLUTION INTRA-ARTICULAR; INTRALESIONAL; INTRAMUSCULAR; INTRAVENOUS; SOFT TISSUE PRN
Status: DISCONTINUED | OUTPATIENT
Start: 2018-11-26 | End: 2018-11-26

## 2018-11-26 RX ORDER — PROPOFOL 10 MG/ML
INJECTION, EMULSION INTRAVENOUS PRN
Status: DISCONTINUED | OUTPATIENT
Start: 2018-11-26 | End: 2018-11-26

## 2018-11-26 RX ADMIN — DEXAMETHASONE SODIUM PHOSPHATE 4 MG: 4 INJECTION, SOLUTION INTRA-ARTICULAR; INTRALESIONAL; INTRAMUSCULAR; INTRAVENOUS; SOFT TISSUE at 08:22

## 2018-11-26 RX ADMIN — OXYMETAZOLINE HYDROCHLORIDE 1 ML: 0.05 SPRAY NASAL at 08:20

## 2018-11-26 RX ADMIN — PROPOFOL 160 MG: 10 INJECTION, EMULSION INTRAVENOUS at 08:17

## 2018-11-26 RX ADMIN — GLYCOPYRROLATE 0.2 MG: 0.2 INJECTION, SOLUTION INTRAMUSCULAR; INTRAVENOUS at 08:15

## 2018-11-26 RX ADMIN — Medication 400 MG: at 10:00

## 2018-11-26 RX ADMIN — ACETAMINOPHEN 975 MG: 325 TABLET ORAL at 07:24

## 2018-11-26 RX ADMIN — SODIUM CHLORIDE, SODIUM LACTATE, POTASSIUM CHLORIDE, CALCIUM CHLORIDE: 600; 310; 30; 20 INJECTION, SOLUTION INTRAVENOUS at 07:23

## 2018-11-26 RX ADMIN — ONDANSETRON 4 MG: 2 INJECTION INTRAMUSCULAR; INTRAVENOUS at 08:58

## 2018-11-26 RX ADMIN — PROPOFOL: 10 INJECTION, EMULSION INTRAVENOUS at 08:57

## 2018-11-26 RX ADMIN — BALANCED SALT SOLUTION 15 ML: 6.4; .75; .48; .3; 3.9; 1.7 SOLUTION OPHTHALMIC at 08:42

## 2018-11-26 RX ADMIN — LIDOCAINE HYDROCHLORIDE 80 MG: 20 INJECTION, SOLUTION INFILTRATION; PERINEURAL at 08:17

## 2018-11-26 RX ADMIN — FENTANYL CITRATE 50 MCG: 50 INJECTION, SOLUTION INTRAMUSCULAR; INTRAVENOUS at 08:15

## 2018-11-26 RX ADMIN — GABAPENTIN 300 MG: 300 CAPSULE ORAL at 07:24

## 2018-11-26 RX ADMIN — PROPOFOL 200 MCG/KG/MIN: 10 INJECTION, EMULSION INTRAVENOUS at 08:19

## 2018-11-26 NOTE — ANESTHESIA CARE TRANSFER NOTE
Patient: Darshana Cash    Procedure(s):  Bilateral Strabismus Repair    Diagnosis: Strabismus  Diagnosis Additional Information: No value filed.    Anesthesia Type:   No value filed.     Note:  Airway :Face Mask  Patient transferred to:PACU  Comments: 119/75  100%  98.6-99-18  Handoff Report: Identifed the Patient, Identified the Reponsible Provider, Reviewed the pertinent medical history, Discussed the surgical course, Reviewed Intra-OP anesthesia mangement and issues during anesthesia, Set expectations for post-procedure period and Allowed opportunity for questions and acknowledgement of understanding      Vitals: (Last set prior to Anesthesia Care Transfer)    CRNA VITALS  11/26/2018 0846 - 11/26/2018 0921      11/26/2018             NIBP: 115/67    Pulse: 88    NIBP Mean: 85    Ht Rate: 88    SpO2: 100 %    Resp Rate (observed): 12    Resp Rate (set): 10                Electronically Signed By: ADAN Maldonado CRNA  November 26, 2018  9:21 AM

## 2018-11-26 NOTE — ANESTHESIA PREPROCEDURE EVALUATION
"Anesthesia Pre-Procedure Evaluation    Patient: Darshana Cash   MRN:     [id number] Gender:   female   Age:    38 year old :      1980        Preoperative Diagnosis: Strabismus   Procedure(s):  Bilateral Strabismus Repair     Past Medical History:   Diagnosis Date     Diplopia      Strabismus       Past Surgical History:   Procedure Laterality Date     STRABISMUS SURGERY  2008          Anesthesia Evaluation     .             ROS/MED HX    ENT/Pulmonary:  - neg pulmonary ROS     Neurologic:  - neg neurologic ROS     Cardiovascular:  - neg cardiovascular ROS       METS/Exercise Tolerance:     Hematologic:  - neg hematologic  ROS       Musculoskeletal:  - neg musculoskeletal ROS       GI/Hepatic:  - neg GI/hepatic ROS       Renal/Genitourinary:  - ROS Renal section negative       Endo:  - neg endo ROS       Psychiatric:  - neg psychiatric ROS       Infectious Disease:  - neg infectious disease ROS       Malignancy:      - no malignancy   Other:    - neg other ROS                     PHYSICAL EXAM:   Mental Status/Neuro: A/A/O   Airway: Facies: Feasible  Mallampati: I  Mouth/Opening: Full  TM distance: > 6 cm  Neck ROM: Full   Respiratory: Auscultation: CTAB     Resp. Rate: Normal     Resp. Effort: Normal      CV: Rhythm: Regular  Rate: Age appropriate  Heart: Normal Sounds   Comments:      Dental: Normal                  Lab Results   Component Value Date    HCG Negative 2018       Preop Vitals  BP Readings from Last 3 Encounters:   18 122/90    Pulse Readings from Last 3 Encounters:   No data found for Pulse      Resp Readings from Last 3 Encounters:   18 16    SpO2 Readings from Last 3 Encounters:   18 98%      Temp Readings from Last 1 Encounters:   18 36.7  C (98.1  F) (Oral)    Ht Readings from Last 1 Encounters:   18 1.702 m (5' 7\")      Wt Readings from Last 1 Encounters:   18 70.3 kg (155 lb)    Estimated body mass index is 24.28 kg/(m^2) as calculated " "from the following:    Height as of this encounter: 1.702 m (5' 7\").    Weight as of this encounter: 70.3 kg (155 lb).     LDA:  Peripheral IV 11/26/18 Left Lower forearm (Active)   Site Assessment WDL 11/26/2018  7:27 AM   Line Status Infusing 11/26/2018  7:27 AM   Phlebitis Scale 0-->no symptoms 11/26/2018  7:27 AM   Number of days:0       Airway - Adult/Peds laryngeal mask airway (Active)   Number of days:0            Assessment:   ASA SCORE: 1    NPO Status: > 6 hours since completed Solid Foods   Documentation: H&P complete; Preop Testing complete; Consents complete   Proceeding: Proceed without further delay  Tobacco Use:  NO Active use of Tobacco/UNKNOWN Tobacco use status     Plan:   Anes. Type:  General   Pre-Induction: Midazolam IV; Acetaminophen PO   Induction:  IV (Standard)   Airway: Oral ETT   Access/Monitoring: PIV   Maintenance: Balanced   Emergence: Procedure Site   Logistics: Same Day Surgery     Postop Pain/Sedation Strategy:  Standard-Options: Opioids PRN     PONV Management:  Adult Risk Factors: Female, Non-Smoker, Postop Opioids  Prevention: Ondansetron     CONSENT: Direct conversation   Plan and risks discussed with: Patient   Blood Products: Consent Deferred (Minimal Blood Loss)                         Blaze Dash MD  "

## 2018-11-26 NOTE — IP AVS SNAPSHOT
MRN:[id number]                      After Visit Summary   11/26/2018    Darshana Cash    MRN: [id number]           Thank you!     Thank you for choosing [facility] for your care. Our goal is always to provide you with excellent care. Hearing back from our patients is one way we can continue to improve our services. Please take a few minutes to complete the written survey that you may receive in the mail after you visit with us. Thank you!        Patient Information     Date Of Birth          1980        About your hospital stay     You were admitted on:  November 26, 2018 [facility]    You were discharged on:  November 26, 2018       Who to Call     For medical emergencies, please call 911.  For non-urgent questions about your medical care, please call your primary care provider or clinic, [phone]  For questions related to your surgery, please call your surgery clinic        Attending Provider     Provider Specialty    Amado Ocampo MD Ophthalmology       Primary Care Provider Office Phone # Fax #    Polina Cobb [phone]      Your next 10 appointments already scheduled     Dec 04, 2018  9:30 AM CST   Post-Op with Amado Ocampo MD   [facility] ([facility])    [address]  [address] [address] [phone]              Further instructions from your care team       Instructions for after your eye muscle surgery:    Instill 1 cm of Tobradex ophthalmic ointment in the operative eye(s) in 3 times per day until follow-up with Dr. Ocampo in about 1 week.  The ointment is expected to blur vision but is necessary.      You will also go home with a prescription for a steroid eye drop. Do NOT start this eye drop yet.  When you see Dr. Ocampo at your post-operative visit he will tell you if and when to start the  drops.    Avoid all eye pressure or trauma for 7 days.  No eye rubbing, straining, swimming, athletics, or outdoor activities in which dirt or foreign objects could enter the eye.      ok to take a bath and shower immediately but don t run shower water on face.      Tylenol or ibuprofen over the counter may be used for pain.  Pain can occasionally be moderate for 1 or 2 days after surgery.  If pain is severe or does not improve greatly with over the counter medications call our office.    Return for follow-up with Dr. Ocampo as already scheduled (generally about 1 week after surgery).  If you do not have an appointment already, please call Guanakito Lubin at [phone] or our  at [phone] and arrange to follow-up in 1 week.    [facility]  Home Care Following Anesthesia  For 24 hours after surgery:  1. Get plenty of rest.  A responsible adult must stay with you for at least 24 hours after you leave the surgery center.  2. Do not drive or use heavy equipment.  If you have weakness or tingling, don't drive or use heavy equipment until this feeling goes away.   3. Do not drink alcohol.   4. Avoid strenuous or risky activities.  Ask for help when climbing stairs.  5. You may feel lightheaded.  IF so, sit for a few minutes before standing.  Have someone help you get up.   6. If you have nausea (feel sick to your stomach): Drink only clear liquids such as apple juice, ginger ale, broth or 7-Up.  Rest may also help.  Be sure to drink enough fluids.  Move to a regular diet as you feel able.   7. You may have a slight fever.  Call the doctor if your fever is over 100 F (37.7 C) (taken under the tongue) or lasts longer than 24 hours.  8. You may have a dry mouth, a sore throat, muscle aches or trouble sleeping. These should go away after 24 hours.  9. Do not make important or legal decisions.     Tips for taking pain medications  To get the best pain relief  possible, remember these points:    Take pain medications as directed, before pain becomes severe.    Pain medication can upset your stomach: taking it with food may help.    Constipation is a common side effect of pain medication. Drink plenty of  fluids.    Eat foods high in fiber. Take a stool softener if recommended by your doctor or pharmacist.    Do not drink alcohol, drive or operate machinery while taking pain medications.    Ask about other ways to control pain, such as with heat, ice or relaxation.    Tylenol/Acetaminophen Consumption  To help encourage the safe use of acetaminophen, the makers of TYLENOL  have lowered the maximum daily dose for single-ingredient Extra Strength TYLENOL  (acetaminophen) products sold in the [place] from 8 pills per day (4,000 mg) to 6 pills per day (3,000 mg). The dosing interval has also changed from 2 pills every 4-6 hours to 2 pills every 6 hours.    If you feel your pain relief is insufficient, you may take Tylenol/Acetaminophen in addition to your narcotic pain medication.     Be careful not to exceed 3,000 mg of Tylenol/Acetaminophen in a 24 hour period from all sources.    If you are taking extra strength Tylenol/acetaminophen (500 mg), the maximum dose is 6 tablets in 24 hours.    If you are taking regular strength acetaminophen (325 mg), the maximum dose is 9 tablets in 24 hours.    Call a doctor for any of the followin. Signs of infection (fever, growing tenderness at the surgery site, a large amount of drainage or bleeding, severe pain, foul-smelling drainage, redness, swelling).  2. It has been over 8 to 10 hours since surgery and you are still not able to urinate (pass water).  3. Headache for over 24 hours.    Your doctor is:       Dr. Amado Ocampo, Ophthalmology: [phone]               Or dial [phone] and ask for the resident on call for:  Ophthalmology  For emergency care, call the:  [place] [facility]:  [phone] (TTY for  "hearing impaired: [phone])                Pending Results     No orders found from 11/24/2018 to 11/27/2018.            Admission Information     Date & Time Provider Department Dept. Phone    11/26/2018 Amado Ocampo MD [facility] [phone]      Your Vitals Were     Blood Pressure Temperature Respirations Height Weight Last Period    119/75 98.4  F (36.9  C) (Temporal) 12 1.702 m (5' 7\") 70.3 kg (155 lb) 11/14/2018    Pulse Oximetry BMI (Body Mass Index)                100% 24.28 kg/m2          [employer] Information     [employer] gives you secure access to your electronic health record. If you see a primary care provider, you can also send messages to your care team and make appointments. If you have questions, please call your primary care clinic.  If you do not have a primary care provider, please call [phone] and they will assist you.      [employer] is an electronic gateway that provides easy, online access to your medical records. With [employer], you can request a clinic appointment, read your test results, renew a prescription or communicate with your care team.     To access your existing account, please contact your [facility] or call [phone] for assistance.        Care EveryWhere ID     This is your Care EveryWhere ID. This could be used by other organizations to access your [facility] medical records  [id number]        Equal Access to Services     SHONNA BRANHAM : Hadii brittany nayako Soolesya, waaxda luqadaha, qaybta kaalmada elsy moreno . So [facility] [phone].    ATENCIÓN: Si habla español, tiene a cha disposición servicios gratuitos de asistencia lingüística. Llame al [phone].    We comply with applicable federal civil rights laws and [place] laws. We do not discriminate on the basis of race, color, national origin, age, disability, sex, sexual orientation, or gender " identity.               Review of your medicines      START taking        Dose / Directions    prednisoLONE acetate 1 % ophthalmic susp   Commonly known as:  PRED FORTE   Used for:  Post-operative state        Dose:  1-2 drop   Place 1-2 drops into both eyes 4 times daily   Quantity:  1 Bottle   Refills:  0         CONTINUE these medicines which have NOT CHANGED        Dose / Directions    ADVIL 200 MG capsule   Generic drug:  ibuprofen        Refills:  0       NAPROXEN PO        Refills:  0            Where to get your medicines      These medications were sent to [address] [place] [address] [address] [place] [id number]    Hours:  TRANSPLANT PHONE NUMBER [phone] Phone:  [phone]     prednisoLONE acetate 1 % ophthalmic susp                Protect others around you: Learn how to safely use, store and throw away your medicines at www.disposemymeds.org.             Medication List: This is a list of all your medications and when to take them. Check marks below indicate your daily home schedule. Keep this list as a reference.      Medications           Morning Afternoon Evening Bedtime As Needed    ADVIL 200 MG capsule   Generic drug:  ibuprofen                                NAPROXEN PO                                prednisoLONE acetate 1 % ophthalmic susp   Commonly known as:  PRED FORTE   Place 1-2 drops into both eyes 4 times daily

## 2018-11-26 NOTE — IP AVS SNAPSHOT
[facility]    [address] [place] [address]    Phone:  [phone]    Fax:  [phone]                                       After Visit Summary   11/26/2018    Darshana Cash    MRN: [id number]           After Visit Summary Signature Page     I have received my discharge instructions, and my questions have been answered. I have discussed any challenges I see with this plan with the nurse or doctor.    ..........................................................................................................................................  Patient/Patient Representative Signature      ..........................................................................................................................................  Patient Representative Print Name and Relationship to Patient    ..................................................               ................................................  Date                                   Time    ..........................................................................................................................................  Reviewed by Signature/Title    ...................................................              ..............................................  Date                                               Time          22EPIC Rev 08/18

## 2018-11-26 NOTE — DISCHARGE INSTRUCTIONS
Instructions for after your eye muscle surgery:    Instill 1 cm of Tobradex ophthalmic ointment in the operative eye(s) in 3 times per day until follow-up with Dr. Ocampo in about 1 week.  The ointment is expected to blur vision but is necessary.      You will also go home with a prescription for a steroid eye drop. Do NOT start this eye drop yet.  When you see Dr. Ocampo at your post-operative visit he will tell you if and when to start the drops.    Avoid all eye pressure or trauma for 7 days.  No eye rubbing, straining, swimming, athletics, or outdoor activities in which dirt or foreign objects could enter the eye.      ok to take a bath and shower immediately but don t run shower water on face.      Tylenol or ibuprofen over the counter may be used for pain.  Pain can occasionally be moderate for 1 or 2 days after surgery.  If pain is severe or does not improve greatly with over the counter medications call our office.    Return for follow-up with Dr. Ocampo as already scheduled (generally about 1 week after surgery).  If you do not have an appointment already, please call Guanakito Lubin at [phone] or our  at [phone] and arrange to follow-up in 1 week.    [facility]  Home Care Following Anesthesia  For 24 hours after surgery:  1. Get plenty of rest.  A responsible adult must stay with you for at least 24 hours after you leave the surgery center.  2. Do not drive or use heavy equipment.  If you have weakness or tingling, don't drive or use heavy equipment until this feeling goes away.   3. Do not drink alcohol.   4. Avoid strenuous or risky activities.  Ask for help when climbing stairs.  5. You may feel lightheaded.  IF so, sit for a few minutes before standing.  Have someone help you get up.   6. If you have nausea (feel sick to your stomach): Drink only clear liquids such as apple juice, ginger ale, broth or 7-Up.  Rest may also help.   Be sure to drink enough fluids.  Move to a regular diet as you feel able.   7. You may have a slight fever.  Call the doctor if your fever is over 100 F (37.7 C) (taken under the tongue) or lasts longer than 24 hours.  8. You may have a dry mouth, a sore throat, muscle aches or trouble sleeping. These should go away after 24 hours.  9. Do not make important or legal decisions.     Tips for taking pain medications  To get the best pain relief possible, remember these points:    Take pain medications as directed, before pain becomes severe.    Pain medication can upset your stomach: taking it with food may help.    Constipation is a common side effect of pain medication. Drink plenty of  fluids.    Eat foods high in fiber. Take a stool softener if recommended by your doctor or pharmacist.    Do not drink alcohol, drive or operate machinery while taking pain medications.    Ask about other ways to control pain, such as with heat, ice or relaxation.    Tylenol/Acetaminophen Consumption  To help encourage the safe use of acetaminophen, the makers of TYLENOL  have lowered the maximum daily dose for single-ingredient Extra Strength TYLENOL  (acetaminophen) products sold in the [place] from 8 pills per day (4,000 mg) to 6 pills per day (3,000 mg). The dosing interval has also changed from 2 pills every 4-6 hours to 2 pills every 6 hours.    If you feel your pain relief is insufficient, you may take Tylenol/Acetaminophen in addition to your narcotic pain medication.     Be careful not to exceed 3,000 mg of Tylenol/Acetaminophen in a 24 hour period from all sources.    If you are taking extra strength Tylenol/acetaminophen (500 mg), the maximum dose is 6 tablets in 24 hours.    If you are taking regular strength acetaminophen (325 mg), the maximum dose is 9 tablets in 24 hours.    Call a doctor for any of the followin. Signs of infection (fever, growing tenderness at the surgery site, a large amount of drainage or bleeding,  severe pain, foul-smelling drainage, redness, swelling).  2. It has been over 8 to 10 hours since surgery and you are still not able to urinate (pass water).  3. Headache for over 24 hours.    Your doctor is:       Dr. Amado Ocampo, Ophthalmology: [phone]               Or dial [phone] and ask for the resident on call for:  Ophthalmology  For emergency care, call the:  [place] [facility]:  [phone] (TTY for hearing impaired: [phone])

## 2018-11-26 NOTE — BRIEF OP NOTE
Ophthalmology Brief Operative Note    Pre-operative diagnosis: Strabismus   Post-operative diagnosis Strabismus    Procedure: Procedure(s):  Bilateral Strabismus Repair   Surgeon: Amado Ocampo MD   Assistants(s): MD Meño Swift MD   Estimated blood loss: Minimal    Specimens: None   Findings: See full operative report     Adelfo Wilson MD  PGY3

## 2018-11-26 NOTE — OP NOTE
"ATTENDING SURGEON:  Amado Ocampo MD    DATE OF PROCEDURE:  November 26, 2018    FIRST SURGICAL ASSISTANT:  SHOLA CLINE MD    SECOND SURGICAL ASSISTANT:   CAITLIN HERNANDEZ MD     ANESTHESIA:  General anesthesia    PREOPERATIVE DIAGNOSIS (ES):  1. Esotropia     POSTOPERATIVE DIAGNOSIS (ES):  1. Esotropia      NAME OF OPERATION:  1. Right lateral rectus resection 8.0 mm  2. Left lateral rectus resection 8.0 mm    INDICATIONS FOR PROCEDURE:    The patient is a pleasant 38 year old with challenging strabismus.  She had a moderate angle comitant esotropia on initial presentation. She did not have facultative suppression but also had difficulty with fusion.  Synoptophore testing showed difficulty with fusion but did not illuminate why.  We prism adapted her and eventually she found that a 35 prism diopter Fresnel greatly improved her diplopia.  She still had diplopia but it was much less subjectively bothersome.      I warned the patient about the risks, benefits, and alternatives of eye muscle surgery including a relatively small risk for severe infection, retinal detachment, and permanent vision loss of the eye.  I also discussed the possibility of postoperative double vision.  I discussed the possibility that due to postoperative double vision or a postoperative recurrent strabismus, the patient may require additional strabismus procedures in the future.  Understanding these risks, the patient decided to proceed with strabismus surgery.      DESCRIPTION OF PROCEDURE:    After the risks, benefits, and alternatives of eye muscle surgery were discussed with the patient and the patient's questions were answered both in clinic and on the day of surgery, written informed consent was obtained and witnessed in the preoperative holding area on the day of surgery.  The word \"yes\" was written over both eyes indicating that the patient consented to eye muscle correction in both eyes.  An intravenous line was placed and light " intravenous sedation was given.  The patient was transported to the operating room where after appropriate monitors were placed, the patient underwent induction of general anesthesia without complication.      Both eyes were prepped and draped in the usual sterile fashion for ophthalmic surgery and a lid speculum was placed in the right eye.  Forced duction testing in this eye revealed no restriction.  A trapezoidal temporal conjunctival flap was created using conjunctival forceps and Moira scissors.  This was reflected backwards to expose the right lateral rectus muscle which was isolated using a Franco muscle hook.  The muscle was freed from Tenon's capsule with blunt dissection using a Moira scissors and cotton swab.  A double armed 6-0 Vicryl suture was then woven across the width of the muscle at a point measured to be 8.0 mm posterior to the insertion and tied to the edges.  A hemostat straight clamp was then clamped perpendicular to the muscle just anterior to the suture and the distal 7.5 mm muscle segment was excised with a Moira scissors and 0.3 forceps.  Both arms of the 6-0 Vicryl suture were then passed partial thickness through the sclera in a crossing swords technique at the physiologic insertion site.  At this point, the ends of the suture were tied together tightly advancing the muscle and completing a resection effect of 8.0 mm.  The needles were cut off and the ends were cut short.  The conjunctival flap was then re-opposed in the surgical bed and held in place using two 6-0 plain gut sutures.  The lid speculum was removed from the operative eye.     A lid speculum was placed in the left eye.  Forced duction testing in this eye revealed no restriction.  A trapezoidal temporal conjunctival flap was created using conjunctival forceps and Moira scissors.  This was reflected backwards to expose the left lateral rectus muscle which was isolated using a Franco muscle hook.  The muscle was  freed from Tenon's capsule with blunt dissection using a Moira scissors and cotton swab.  A double armed 6-0 Vicryl suture was then woven across the width of the muscle at a point measured to be 8.0 mm posterior to the insertion and tied to the edges.  A hemostat straight clamp was then clamped perpendicular to the muscle just anterior to the suture and the distal 7.5 mm muscle segment was excised with a Moira scissors and 0.3 forceps.  Both arms of the 6-0 Vicryl suture were then passed partial thickness through the sclera in a crossing swords technique at the physiologic insertion site.  At this point, the ends of the suture were tied together tightly advancing the muscle and completing a resection effect of 8.0 mm.  The needles were cut off and the ends were cut short.  The conjunctival flap was then re-opposed in the surgical bed and held in place using two 6-0 plain gut sutures.  The lid speculum was removed from the operative eye.     TobraDex ointment was placed in both eyes.  The patient was awakened from general anesthesia without complication and discharged to the recovery room in stable condition.       SPECIMENS REMOVED:  None.      ESTIMATED BLOOD LOSS:  1 mL or less.    INTRAOPERATIVE FLUIDS:  As per anesthesia records.      SPONGE/INSTRUMENT/NEEDLE COUNTS:  All sponge, instrument, and needle counts were correct times two.    CONDITION ON DISCHARGE FROM OPERATING ROOM:  Stable.      COMPLICATIONS:  None.     I was present for the entire procedure

## 2018-11-26 NOTE — ANESTHESIA POSTPROCEDURE EVALUATION
Anesthesia POST Procedure Evaluation    Patient: Darshana Cash   MRN:     [id number] Gender:   female   Age:    38 year old :      1980        Preoperative Diagnosis: Strabismus   Procedure(s):  Bilateral Strabismus Repair   Postop Comments: No value filed.       Anesthesia Type:  General    Reportable Event: NO     PAIN: Uncomplicated   Sign Out status: Comfortable, Well controlled pain     PONV: No PONV   Sign Out status:  No Nausea or Vomiting     Neuro/Psych: Uneventful perioperative course   Sign Out Status: Preoperative baseline; Age appropriate mentation     Airway/Resp.: Uneventful perioperative course   Sign Out Status: Non labored breathing, age appropriate RR; Resp. Status within EXPECTED Parameters     CV: Uneventful perioperative course   Sign Out status: Appropriate BP and perfusion indices; Appropriate HR/Rhythm     Disposition:   Sign Out in:  PACU  Disposition:  Phase II; Home  Recovery Course: Uneventful  Follow-Up: Not required           Last Anesthesia Record Vitals:  CRNA VITALS  2018 0846 - 2018 0946      2018             NIBP: 115/67    Pulse: 88    NIBP Mean: 85    Ht Rate: 88    SpO2: 100 %    Resp Rate (observed): 12    Resp Rate (set): 10          Last PACU/Preop Vitals:  Vitals:    18 0935 18 0937 18 0950   BP: 121/69 126/66 123/77   Resp: 12 12 12   Temp:  37.1  C (98.8  F) 37.1  C (98.8  F)   SpO2: 99% 99% 98%         Electronically Signed By: Blaze Dash MD, 2018, 1:20 PM

## 2018-11-27 ENCOUNTER — TELEPHONE (OUTPATIENT)
Dept: OPHTHALMOLOGY | Facility: CLINIC | Age: 38
End: 2018-11-27

## 2018-11-27 NOTE — TELEPHONE ENCOUNTER
----- Message from Guanakito Lubin sent at 11/27/2018 10:12 AM CST -----  Tracy,    I have no idea how to answer post op questions. Is Dr. Restrepo available to help you with these?    Guanakito Tena    ----- Message -----     From: Yvonne Carlisle     Sent: 11/27/2018   9:46 AM       To: Guanakito Tena,     If you are available would you be able to call some post ops with concerns? I'm in [place] and super busy  ----- Message -----     From: Julissa Almaraz     Sent: 11/26/2018  12:26 PM       To: Yvonne Hernandez called earlier today (11/26/18) with some post op concerns.     Please call back at [phone]

## 2018-11-30 ENCOUNTER — TELEPHONE (OUTPATIENT)
Dept: OPHTHALMOLOGY | Facility: CLINIC | Age: 38
End: 2018-11-30

## 2018-11-30 NOTE — TELEPHONE ENCOUNTER
Pain: right eye - constant pain; improving significantly   Some white spots on the eye, right where suture is  Using OTC medication: yes    Abnormal swelling or purulent discharge: not, right eye lid was swollen but it hasn't gotten worse.     Patient using ointment as directed (TID): yes    Vision: still noticing still double, but significantly better than what she was seeing before. Constant two images but they are very close and it seems like they are getting closer and closer each day. She was expecting to still have double but she is very happy with what she is seeing.     Reminded patient of post op date (12/4/18 at 9:30am) and provided instructions for eye drop (QID) should patient run out of ointment before post op date.   Provided patient with direct line in case of questions or concerns     Yvonne Carlisle  Neuro-Ophthalmology Clinical Facilitator  [phone]  11:38 AM November 30, 2018

## 2018-12-04 ENCOUNTER — OFFICE VISIT (OUTPATIENT)
Dept: OPHTHALMOLOGY | Facility: CLINIC | Age: 38
End: 2018-12-04
Attending: OPHTHALMOLOGY
Payer: COMMERCIAL

## 2018-12-04 DIAGNOSIS — H50.012 MONOCULAR ESOTROPIA, LEFT EYE: Primary | ICD-10-CM

## 2018-12-04 PROCEDURE — G0463 HOSPITAL OUTPT CLINIC VISIT: HCPCS | Mod: ZF

## 2018-12-04 ASSESSMENT — REFRACTION_WEARINGRX
OS_AXIS: 005
OS_CYLINDER: +0.75
OS_SPHERE: -3.25
SPECS_TYPE: SVL
OD_SPHERE: -2.50
OD_CYLINDER: SPHERE

## 2018-12-04 ASSESSMENT — VISUAL ACUITY
OD_CC: 20/25
CORRECTION_TYPE: GLASSES
OS_CC: 20/20
METHOD: SNELLEN - LINEAR

## 2018-12-04 NOTE — MR AVS SNAPSHOT
After Visit Summary   12/4/2018    Darshana Cash    MRN: [id number]           Patient Information     Date Of Birth          1980        Visit Information        Provider Department      12/4/2018 9:30 AM Amado Ocampo MD [facility]         Follow-ups after your visit        Your next 10 appointments already scheduled     Apr 02, 2019 11:00 AM CDT   Return Adult Strabismus with Amado Ocampo MD   [facility] ([facility])    [address]  [address] [id number] [phone]              Who to contact     Please call your clinic at [phone] to:    Ask questions about your health    Make or cancel appointments    Discuss your medicines    Learn about your test results    Speak to your doctor            Additional Information About Your Visit        MyChart Information     [employer] gives you secure access to your electronic health record. If you see a primary care provider, you can also send messages to your care team and make appointments. If you have questions, please call your primary care clinic.  If you do not have a primary care provider, please call [phone] and they will assist you.      [employer] is an electronic gateway that provides easy, online access to your medical records. With [employer], you can request a clinic appointment, read your test results, renew a prescription or communicate with your care team.     To access your existing account, please contact your [facility] or call [phone] for assistance.        Care EveryWhere ID     This is your Care EveryWhere ID. This could be used by other organizations to access your [facility] medical records  [id number]        Your Vitals Were     Last Period                   11/14/2018            Blood Pressure from Last 3 Encounters:   11/26/18 123/77    Weight from Last 3 Encounters:   11/26/18 70.3 kg (155  lb)              Today, you had the following     No orders found for display       Primary Care Provider Office Phone # Fax #    Polina Cobb [phone]       [facility] [address]  [facility] [address]        Equal Access to Services     SHONNA BRANHAM : Hadii aad ku hadanto Soomaali, waaxda luqadaha, qaybta kaalmada adeegyada, waxcarlos idiin haymckaylan adeedwin sunbernadine chavis. So [facility] [phone].    ATENCIÓN: Si habla español, tiene a cha disposición servicios gratuitos de asistencia lingüística. Llame al [phone].    We comply with applicable federal civil rights laws and [place] laws. We do not discriminate on the basis of race, color, national origin, age, disability, sex, sexual orientation, or gender identity.            Thank you!     Thank you for choosing [facility]  for your care. Our goal is always to provide you with excellent care. Hearing back from our patients is one way we can continue to improve our services. Please take a few minutes to complete the written survey that you may receive in the mail after your visit with us. Thank you!             Your Updated Medication List - Protect others around you: Learn how to safely use, store and throw away your medicines at www.disposemymeds.org.          This list is accurate as of 12/4/18 10:11 AM.  Always use your most recent med list.                   Brand Name Dispense Instructions for use Diagnosis    ADVIL 200 MG capsule   Generic drug:  ibuprofen           NAPROXEN PO           prednisoLONE acetate 1 % ophthalmic suspension    PRED FORTE    1 Bottle    Place 1-2 drops into both eyes 4 times daily    Post-operative state

## 2018-12-04 NOTE — NURSING NOTE
Chief Complaint   Patient presents with     Post Op (Ophthalmology) Both Eyes     S/P BLRs 8.0 11/26. Still noting diplopia, but images are closer. Using tobradex. No pain, some discharge in RE.

## 2018-12-04 NOTE — LETTER
2018    RE: Darshana Cash  : 1980  MRN: [id number]    Dear Providers,    I saw our mutual patient, Darshana Cash, in follow-up in my clinic recently.  After a thorough neuro-ophthalmic history and examination, I came to the following conclusions:        1. 1 week post-op status post strabismus surgery:     -Surgery:  1. Right lateral rectus resection 8.0 mm  2. Left lateral rectus resection 8.0 mm    - Alignment: Doing great.  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 Patient states diplopia is still present but virtually resolved- much easier for her to tolerate after surgery. Patient is happy.  - Healing up appropriately  - Tobradex ophthalmic ointment: stop  - Start Predforte 1% four times a day in the operative eye(s) and decrease by one drop daily every week.  Course will complete in 1 month.    Return to clinic in 3 months or sooner as needed.        For further exam details, please feel free to contact our office for additional records.  If you wish to contact me regarding this patient please email me at [email or web address] or give my clinic a call to arrange a phone conversation.    Sincerely,    Amado Ocampo MD  , Neuro-Ophthalmology and Adult Strabismus Surgery  The [facility] Hurley Chair in Neuro-Ophthalmology  Department of Ophthalmology and Visual Neurosciences  [facility]      The [facility] will be hosting the second annual Neuro-ophthalmology and Oculoplastics review course for Optometrists. We hope you can join us!    Who:  All Optometrists  What: Neuro-ophthalmology and Oculoplastics Review for Optometrists:     When to Manage and When to Refer  When: Friday, 2019  COPE credits will be available for all lectures and case discussion sessions  8:30-9:00  Registration and  Coffee & Pastries   9:00 Update on NMO and MOG optic neuritis           9:30 Maculopathies Which Mimic Optic Neuropathy       10:00 The Complex Lower Lid                                                       10:30 Break   10:45 Thyroid Eye Disease                                                                 11:30 New Microinvasive Surgical Options for Floaters and Glaucoma                          12:00 Buffet Lunch   12:30 Ptosis Rules of the Road                                                     1:00 Tearing                                                                                          1:30 The Diagnosis and Management of Giant Cell Arteritis: A collaborative approach  between eye care providers and rheumatology                                                  2:15 Break - Cocktails & Appetizers  2:30 Cases with Colleagues   4:00     Course Ends   Where:[facility] [place] [address]  Why: To improve the care of challenging patients.  To earn COPE credits!  How: Online registration can be completed at:    [email or web address]  Cost = $100 early bird registration (before Friday, February 15, 2019)              $125 up to the day of the event

## 2018-12-04 NOTE — NURSING NOTE
Chief Complaint   Patient presents with     Post Op (Ophthalmology) Both Eyes     S/P BLRc8.0 11/26. Still noting diplopia, but images are closer. Using tobradex. No pain, some discharge in RE.

## 2018-12-09 NOTE — PROGRESS NOTES
1. 1 week post-op status post strabismus surgery:     -Surgery:  1. Right lateral rectus resection 8.0 mm  2. Left lateral rectus resection 8.0 mm    - Alignment: Doing great.  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 Patient states diplopia is still present but virtually resolved- much easier for her to tolerate after surgery. Patient is happy.  - Healing up appropriately  - Tobradex ophthalmic ointment: stop  - Start Predforte 1% four times a day in the operative eye(s) and decrease by one drop daily every week.  Course will complete in 1 month.    Return to clinic in 3 months or sooner as needed.       Complete documentation of historical and exam elements from today's encounter can be found in the full encounter summary report (not reduplicated in this progress note).  I personally obtained the chief complaint(s) and history of present illness.  I confirmed and edited as necessary the review of systems, past medical/surgical history, family history, social history, and examination findings as documented by others; and I examined the patient myself.  I personally reviewed the relevant tests, images, and reports as documented above.  I formulated and edited as necessary the assessment and plan and discussed the findings and management plan with the patient and family   Amado Ocampo MD

## 2019-04-02 ENCOUNTER — OFFICE VISIT (OUTPATIENT)
Dept: OPHTHALMOLOGY | Facility: CLINIC | Age: 39
End: 2019-04-02
Attending: OPHTHALMOLOGY
Payer: COMMERCIAL

## 2019-04-02 DIAGNOSIS — H50.012 ESOTROPIA OF LEFT EYE: Primary | ICD-10-CM

## 2019-04-02 DIAGNOSIS — H53.10 SUBJECTIVE VISUAL DISTURBANCE: ICD-10-CM

## 2019-04-02 DIAGNOSIS — H53.2 DOUBLE VISION: ICD-10-CM

## 2019-04-02 PROCEDURE — 92060 SENSORIMOTOR EXAMINATION: CPT | Mod: ZF | Performed by: OPHTHALMOLOGY

## 2019-04-02 PROCEDURE — G0463 HOSPITAL OUTPT CLINIC VISIT: HCPCS | Mod: 25,ZF

## 2019-04-02 ASSESSMENT — VISUAL ACUITY
CORRECTION_TYPE: GLASSES
OD_CC: 20/20
METHOD: SNELLEN - LINEAR
OS_CC: 20/20

## 2019-04-02 ASSESSMENT — REFRACTION_WEARINGRX
OD_SPHERE: -2.50
SPECS_TYPE: SVL
OD_CYLINDER: SPHERE
OS_SPHERE: -3.25
OS_AXIS: 005
OS_CYLINDER: +0.75

## 2019-04-02 ASSESSMENT — TONOMETRY
OS_IOP_MMHG: 14
OD_IOP_MMHG: 13
IOP_METHOD: ICARE SINGLE

## 2019-04-02 ASSESSMENT — SLIT LAMP EXAM - LIDS
COMMENTS: NORMAL
COMMENTS: NORMAL

## 2019-04-02 NOTE — NURSING NOTE
Chief Complaint(s) and History of Present Illness(es)     Esotropia Follow Up     Laterality: right eye    Associated symptoms: Negative for headaches and eye pain              Comments     Very happy with surgery, diplopia is intermittent but not bothersome.

## 2019-04-02 NOTE — PROGRESS NOTES
1. 4 months post-op status post strabismus surgery for esotropia with lack of fusion and lack of suppression.    -Surgery: 11/26/18  1. Right lateral rectus resection 8.0 mm  2. Left lateral rectus resection 8.0 mm    - Alignment: Doing great.  6 - 12 ET.   - Diplopia: patient has had constant diplopia prior to surgery- lack of   fusion and incomplete suppression under binocular conditions. Patient   states diplopia is still present but virtually resolved- much easier for   her to tolerate after surgery. Patient is very happy.  - Healed up appropriately       Complete documentation of historical and exam elements from today's encounter can be found in the full encounter summary report (not reduplicated in this progress note).  I personally obtained the chief complaint(s) and history of present illness.  I confirmed and edited as necessary the review of systems, past medical/surgical history, family history, social history, and examination findings as documented by others; and I examined the patient myself.  I personally reviewed the relevant tests, images, and reports as documented above.  I formulated and edited as necessary the assessment and plan and discussed the findings and management plan with the patient and family     Amado Ocampo MD

## 2020-03-10 ENCOUNTER — HEALTH MAINTENANCE LETTER (OUTPATIENT)
Age: 40
End: 2020-03-10

## 2020-12-20 ENCOUNTER — HEALTH MAINTENANCE LETTER (OUTPATIENT)
Age: 40
End: 2020-12-20

## 2021-04-24 ENCOUNTER — HEALTH MAINTENANCE LETTER (OUTPATIENT)
Age: 41
End: 2021-04-24

## 2021-10-03 ENCOUNTER — HEALTH MAINTENANCE LETTER (OUTPATIENT)
Age: 41
End: 2021-10-03

## 2022-05-15 ENCOUNTER — HEALTH MAINTENANCE LETTER (OUTPATIENT)
Age: 42
End: 2022-05-15

## 2022-09-10 ENCOUNTER — HEALTH MAINTENANCE LETTER (OUTPATIENT)
Age: 42
End: 2022-09-10

## 2023-06-03 ENCOUNTER — HEALTH MAINTENANCE LETTER (OUTPATIENT)
Age: 43
End: 2023-06-03

## 2023-09-25 NOTE — BRIEF OP NOTE
CERTIFICATE OF {SCHOOL/WORK/SPORTS:999480}       September 25, 2023      Re: Julián Krishnan  [address] [place] [address]      This is to certify that Julián Krishnan has been under my care from 9/25/2023 and {RETURN TO WORK OR SCHOOL:575830}    RESTRICTIONS: ***      REMARKS: ***        SIGNATURE:___________________________________________          Christophe Irvin OD  [facility]  [address] [address]  Dept Phone: [phone]   [facility] Brief Operative Note    Pre-operative diagnosis: Strabismus   Post-operative diagnosis Same   Procedure: Procedure(s):  Bilateral Strabismus Repair   Surgeon: Amado Ocampo MD   Assistants(s): Meño Restrepo MD and Adelfo Landrum MD   Estimated blood loss: Less than 1 cc    Specimens: None   Findings: See full operative report

## 2024-02-18 ENCOUNTER — HEALTH MAINTENANCE LETTER (OUTPATIENT)
Age: 44
End: 2024-02-18

## (undated) DEVICE — SU VICRYL 6-0 S-14DA 18" UND J670G

## (undated) DEVICE — SOL WATER IRRIG 500ML BOTTLE 2F7113

## (undated) DEVICE — PACK MINOR EYE CUSTOM ASC

## (undated) DEVICE — GLOVE PROTEXIS MICRO 7.5  2D73PM75

## (undated) DEVICE — LINEN TOWEL PACK X5 5464

## (undated) DEVICE — EYE SPONGE SPEAR WECK CEL 0008685

## (undated) DEVICE — EYE PREP BETADINE 5% SOLUTION 30ML 0065-0411-30

## (undated) DEVICE — DRAPE STERI TOWEL LG 1010

## (undated) RX ORDER — LIDOCAINE HYDROCHLORIDE 20 MG/ML
INJECTION, SOLUTION EPIDURAL; INFILTRATION; INTRACAUDAL; PERINEURAL
Status: DISPENSED
Start: 2018-11-26

## (undated) RX ORDER — ONDANSETRON 2 MG/ML
INJECTION INTRAMUSCULAR; INTRAVENOUS
Status: DISPENSED
Start: 2018-11-26

## (undated) RX ORDER — ACETAMINOPHEN 325 MG/1
TABLET ORAL
Status: DISPENSED
Start: 2018-11-26

## (undated) RX ORDER — GABAPENTIN 300 MG/1
CAPSULE ORAL
Status: DISPENSED
Start: 2018-11-26

## (undated) RX ORDER — FENTANYL CITRATE 50 UG/ML
INJECTION, SOLUTION INTRAMUSCULAR; INTRAVENOUS
Status: DISPENSED
Start: 2018-11-26

## (undated) RX ORDER — PROPOFOL 10 MG/ML
INJECTION, EMULSION INTRAVENOUS
Status: DISPENSED
Start: 2018-11-26

## (undated) RX ORDER — DEXAMETHASONE SODIUM PHOSPHATE 4 MG/ML
INJECTION, SOLUTION INTRA-ARTICULAR; INTRALESIONAL; INTRAMUSCULAR; INTRAVENOUS; SOFT TISSUE
Status: DISPENSED
Start: 2018-11-26

## (undated) RX ORDER — GLYCOPYRROLATE 0.2 MG/ML
INJECTION INTRAMUSCULAR; INTRAVENOUS
Status: DISPENSED
Start: 2018-11-26

## (undated) RX ORDER — IBUPROFEN 200 MG
TABLET ORAL
Status: DISPENSED
Start: 2018-11-26